# Patient Record
Sex: MALE | Race: WHITE | URBAN - METROPOLITAN AREA
[De-identification: names, ages, dates, MRNs, and addresses within clinical notes are randomized per-mention and may not be internally consistent; named-entity substitution may affect disease eponyms.]

---

## 2017-04-05 ENCOUNTER — OFFICE (OUTPATIENT)
Dept: URBAN - METROPOLITAN AREA CLINIC 33 | Facility: CLINIC | Age: 77
End: 2017-04-05
Payer: OTHER GOVERNMENT

## 2017-04-05 ENCOUNTER — OFFICE (OUTPATIENT)
Dept: URBAN - METROPOLITAN AREA CLINIC 33 | Facility: CLINIC | Age: 77
End: 2017-04-05

## 2017-04-05 VITALS
TEMPERATURE: 97.5 F | HEIGHT: 68 IN | DIASTOLIC BLOOD PRESSURE: 72 MMHG | SYSTOLIC BLOOD PRESSURE: 133 MMHG | WEIGHT: 196 LBS | HEART RATE: 64 BPM

## 2017-04-05 DIAGNOSIS — Z12.11 ENCOUNTER FOR SCREENING FOR MALIGNANT NEOPLASM OF COLON: ICD-10-CM

## 2017-04-05 PROCEDURE — 00031: CPT

## 2017-04-05 PROCEDURE — 99204 OFFICE O/P NEW MOD 45 MIN: CPT

## 2017-04-05 NOTE — SERVICEHPINOTES
Mr. Bass is a 76 yr old male here to schedule a colonoscopy. His last one in 2/2012 showed a lipoma, given a 5 yr recall. No known family hx of CRC. No current GI issues and no history of GI issues. He has no know heart issues. No abdominal pain or weight loss. He doesn't smoke and drinks ETOH socially.

## 2017-05-19 ENCOUNTER — ON CAMPUS - OUTPATIENT (OUTPATIENT)
Dept: URBAN - METROPOLITAN AREA HOSPITAL 14 | Facility: HOSPITAL | Age: 77
End: 2017-05-19
Payer: MEDICARE

## 2017-05-19 DIAGNOSIS — Z12.11 ENCOUNTER FOR SCREENING FOR MALIGNANT NEOPLASM OF COLON: ICD-10-CM

## 2017-05-19 PROCEDURE — G0121 COLON CA SCRN NOT HI RSK IND: HCPCS
